# Patient Record
(demographics unavailable — no encounter records)

---

## 2025-07-28 NOTE — ASSESSMENT
[FreeTextEntry1] : Advanced arthritic changes associated with the right hip patient would like to proceed with surgical intervention actually sooner rather than later after our extensive discussion today he does not wish to undergo any extensive waiting time if at all possible I would like to proceed as soon as possible.  Will obtain appropriate authorization to move forward.  In the short run we will try a short course of Celebrex to see if that gives him any benefit and actual preparation for multimodal approach to pain management for the postsurgical period

## 2025-07-28 NOTE — HISTORY OF PRESENT ILLNESS
[de-identified] : Jose is a new patient 64-year-old male seen today referred by Dr. Chinmay Lama for consultation regarding hip replacement surgery.  He has had a progressive course associated with worsening discomfort associated with his right hip atraumatic in nature.  Has tried conservative treatment form of multiple anti-inflammatory medications rounds of physical therapy activity modifications and changes in lifestyle.  With worsening discomfort now affecting routine ADLs pain associated with putting on shoes and socks getting in and out of vehicles and again not able to barely walk short distance to try to maintain his fitness level.  We covered intervention as far as the potential for joint replacement surgery answered questions regarding that as well as post surgical recovery the need for physical therapy and expectations.  Outside radiographs are present and are loaded into the PACS system and show loss of sphericity of the femoral head flattening of the head sclerosis and near complete loss of joint space associate with the right hip.  Notable morphology associated with the proximal femur is a very varus neck shaft angle and low hip center.  Advanced degenerative changes end-stage right hip OA.  Opposite hip shows joint space relatively well-maintained and femoral head remains spherical.

## 2025-07-28 NOTE — PHYSICAL EXAM
[de-identified] : A limited physical exam today demonstrates is significant restriction in range of motion the extremes of rotation do cause recurrence of the patient's pain which is primarily groin based.  Despite this discomfort and the degree of arthritic change he has he is stoic during the exam.  He has a slight limp observed while walking.  He is neurovascularly intact distally.